# Patient Record
Sex: MALE | Race: WHITE | Employment: FULL TIME | URBAN - METROPOLITAN AREA
[De-identification: names, ages, dates, MRNs, and addresses within clinical notes are randomized per-mention and may not be internally consistent; named-entity substitution may affect disease eponyms.]

---

## 2019-04-26 ENCOUNTER — OFFICE VISIT (OUTPATIENT)
Dept: FAMILY MEDICINE CLINIC | Age: 69
End: 2019-04-26

## 2019-04-26 ENCOUNTER — HOSPITAL ENCOUNTER (OUTPATIENT)
Dept: LAB | Age: 69
Discharge: HOME | End: 2019-04-26
Payer: MEDICARE

## 2019-04-26 VITALS
BODY MASS INDEX: 22.11 KG/M2 | OXYGEN SATURATION: 96 % | TEMPERATURE: 98.1 F | DIASTOLIC BLOOD PRESSURE: 75 MMHG | SYSTOLIC BLOOD PRESSURE: 143 MMHG | RESPIRATION RATE: 18 BRPM | HEART RATE: 65 BPM | WEIGHT: 163.2 LBS | HEIGHT: 72 IN

## 2019-04-26 DIAGNOSIS — Z85.46 PERSONAL HISTORY OF MALIGNANT NEOPLASM OF PROSTATE: ICD-10-CM

## 2019-04-26 DIAGNOSIS — Z00.00 WELCOME TO MEDICARE PREVENTIVE VISIT: Primary | ICD-10-CM

## 2019-04-26 DIAGNOSIS — Z80.42 FAMILY HISTORY OF PROSTATE CANCER: ICD-10-CM

## 2019-04-26 DIAGNOSIS — K31.84 NONDIABETIC GASTROPARESIS: ICD-10-CM

## 2019-04-26 DIAGNOSIS — Z12.5 SCREENING PSA (PROSTATE SPECIFIC ANTIGEN): ICD-10-CM

## 2019-04-26 DIAGNOSIS — Z71.89 ACP (ADVANCE CARE PLANNING): ICD-10-CM

## 2019-04-26 DIAGNOSIS — Z12.11 COLON CANCER SCREENING: ICD-10-CM

## 2019-04-26 DIAGNOSIS — Z13.6 SCREENING FOR CARDIOVASCULAR CONDITION: ICD-10-CM

## 2019-04-26 LAB
ALBUMIN SERPL-MCNC: 4.1 G/DL (ref 3.4–5)
ALBUMIN/GLOB SERPL: 1.1 {RATIO} (ref 0.8–1.7)
ALP SERPL-CCNC: 65 U/L (ref 45–117)
ALT SERPL-CCNC: 29 U/L (ref 16–61)
ANION GAP SERPL CALC-SCNC: 5 MMOL/L (ref 3–18)
AST SERPL-CCNC: 20 U/L (ref 15–37)
BASOPHILS # BLD: 0 K/UL (ref 0–0.1)
BASOPHILS NFR BLD: 0 % (ref 0–2)
BILIRUB SERPL-MCNC: 0.5 MG/DL (ref 0.2–1)
BUN SERPL-MCNC: 21 MG/DL (ref 7–18)
BUN/CREAT SERPL: 20 (ref 12–20)
CALCIUM SERPL-MCNC: 8.7 MG/DL (ref 8.5–10.1)
CHLORIDE SERPL-SCNC: 103 MMOL/L (ref 100–108)
CO2 SERPL-SCNC: 30 MMOL/L (ref 21–32)
CREAT SERPL-MCNC: 1.06 MG/DL (ref 0.6–1.3)
DIFFERENTIAL METHOD BLD: ABNORMAL
EOSINOPHIL # BLD: 0.2 K/UL (ref 0–0.4)
EOSINOPHIL NFR BLD: 4 % (ref 0–5)
ERYTHROCYTE [DISTWIDTH] IN BLOOD BY AUTOMATED COUNT: 13.3 % (ref 11.6–14.5)
GLOBULIN SER CALC-MCNC: 3.6 G/DL (ref 2–4)
GLUCOSE SERPL-MCNC: 85 MG/DL (ref 74–99)
HCT VFR BLD AUTO: 40.6 % (ref 36–48)
HGB BLD-MCNC: 13.1 G/DL (ref 13–16)
LYMPHOCYTES # BLD: 2 K/UL (ref 0.9–3.6)
LYMPHOCYTES NFR BLD: 33 % (ref 21–52)
MCH RBC QN AUTO: 30.3 PG (ref 24–34)
MCHC RBC AUTO-ENTMCNC: 32.3 G/DL (ref 31–37)
MCV RBC AUTO: 93.8 FL (ref 74–97)
MONOCYTES # BLD: 0.7 K/UL (ref 0.05–1.2)
MONOCYTES NFR BLD: 12 % (ref 3–10)
NEUTS SEG # BLD: 3.1 K/UL (ref 1.8–8)
NEUTS SEG NFR BLD: 51 % (ref 40–73)
PLATELET # BLD AUTO: 212 K/UL (ref 135–420)
PMV BLD AUTO: 9.8 FL (ref 9.2–11.8)
POTASSIUM SERPL-SCNC: 4.1 MMOL/L (ref 3.5–5.5)
PROT SERPL-MCNC: 7.7 G/DL (ref 6.4–8.2)
RBC # BLD AUTO: 4.33 M/UL (ref 4.7–5.5)
SODIUM SERPL-SCNC: 138 MMOL/L (ref 136–145)
WBC # BLD AUTO: 6 K/UL (ref 4.6–13.2)

## 2019-04-26 PROCEDURE — 84153 ASSAY OF PSA TOTAL: CPT

## 2019-04-26 PROCEDURE — 85025 COMPLETE CBC W/AUTO DIFF WBC: CPT

## 2019-04-26 PROCEDURE — 80053 COMPREHEN METABOLIC PANEL: CPT

## 2019-04-26 NOTE — PROGRESS NOTES
This is a \"Welcome to [employer]"  Initial Preventive Physical Examination (IPPE) providing Personalized Prevention Plan Services (Performed in the first 12 months of enrollment) I have reviewed the patient's medical history in detail and updated the computerized patient record. History Past Medical History:  
Diagnosis Date  Diverticulitis  Gastroparesis  Gastroparesis   Genital warts  Penile swelling Past Surgical History:  
Procedure Laterality Date  APPENDECTOMY  HX APPENDECTOMY  HX COLECTOMY  PART REMOVAL COLON W ANASTOMOSIS  2007  
 colon resection Current Outpatient Medications Medication Sig Dispense Refill  CALCIUM CARB/D3/MAGNESIUM/ZINC (CALCIUM CARB-D3-MAG [id number]) [phone] mg-unit-mg-mg tab Take  by mouth.  cholecalciferol (VITAMIN D3) 1,000 unit cap Take  by mouth daily.  ibuprofen (MOTRIN) 200 mg tablet Take  by mouth every six (6) hours as needed for Pain. No Known Allergies Family History Problem Relation Age of Onset  Other Mother   
     non-hodgkins lymphoma  Cancer Mother  Obesity Mother  Cancer Father   
     prostate  Prostate Cancer Father  Diabetes Paternal Grandfather  No Known Problems Sister  No Known Problems Brother Social History Tobacco Use  Smoking status: Former Smoker Packs/day: 1.00 Years: 35.00 Pack years: 35.00 Types: Cigarettes Last attempt to quit: 2014 Years since quittin.4  Smokeless tobacco: Never Used Substance Use Topics  Alcohol use: Yes Alcohol/week: 7.2 oz Types: 12 Glasses of wine per week Diet, Lifestyle: No special diet Exercise level: extremely active Depression Risk Screen 3 most recent PHQ Screens 2019 Little interest or pleasure in doing things Not at all Feeling down, depressed, irritable, or hopeless Not at all Total Score PHQ 2 0  
 
 Alcohol Risk Screen You do not drink alcohol or very rarely. You average more than 14 drinks a week. \" I know I drink more than is recommended\" Functional Ability and Level of Safety Hearing Loss Hearing is good. Vision Screening Vision is good. Visual Acuity Screening Right eye Left eye Both eyes Without correction: 20/50 20/50 20/40 With correction: Activities of Daily Living The home contains: no safety equipment. Patient does total self care Fall Risk Screen Fall Risk Assessment, last 12 mths 4/26/2019 Able to walk? Yes Fall in past 12 months? No  
 
 
Abuse Screen Patient is not abused Screening EKG EKG order placed: No 
 
Visit Vitals /75 (BP 1 Location: Left arm, BP Patient Position: Sitting) Pulse 65 Temp 98.1 °F (36.7 °C) (Oral) Resp 18 Ht 6' (1.829 m) Wt 163 lb 3.2 oz (74 kg) SpO2 96% BMI 22.13 kg/m² A&O x3 
RRR, no murmur CTAB, no wheezes or rales Gait normal 
Mood and affect normal. 
 
Patient Care Team  
Patient Care Team: 
Clarise Harada, Daralyn Breed as PCP - General (Physician Assistant) End of Life Planning Advanced care planning directives were discussed with the patient and /or family/caregiver. Assessment/Plan Education and counseling provided: 
Are appropriate based on today's review and evaluation End-of-Life planning (with patient's consent) Prostate cancer screening tests (PSA, covered annually) Colorectal cancer screening tests Diagnoses and all orders for this visit: 
 
1. Welcome to Medicare preventive visit 2. Screening PSA (prostate specific antigen) -     PSA W/ REFLX FREE PSA; Future -     CBC WITH AUTOMATED DIFF; Future 3. Family history of prostate cancer -     PSA W/ REFLX FREE PSA; Future -     CBC WITH AUTOMATED DIFF; Future 4. Colon cancer screening -     OCCULT BLOOD IMMUNOASSAY,DIAGNOSTIC; Future 5. ACP (advance care planning) -     ADVANCE CARE PLANNING FIRST 30 MINS 6. Screening for cardiovascular condition 
-     US EXAM SCREENING AAA; Future 7. Nondiabetic gastroparesis -     CBC WITH AUTOMATED DIFF; Future -     METABOLIC PANEL, COMPREHENSIVE; Future 8. Personal history of malignant neoplasm of prostate -     PSA W/ REFLX FREE PSA; Future Health Maintenance Due Topic Date Due  
 Hepatitis C Screening  1950  Shingrix Vaccine Age 50> (1 of 2) 07/03/1998  
 FOBT Q 1 YEAR AGE 50-75  07/03/1998  GLAUCOMA SCREENING Q2Y  07/03/2013  DTaP/Tdap/Td series (2 - Td) 08/06/2017  Pneumococcal 65+ years (2 of 2 - PPSV23) 01/29/2018

## 2019-04-26 NOTE — PROGRESS NOTES
Shelly Ledesma is a 77 y.o. male here today as a new patient to our practice. He is here for a Medicare Wellness Visit. All assessment completed. Patient states he had a colonoscopy about 12 years ago by Dr. Devora Rogers. It was normal. 
He has an eye exam coming up on May 1, 2019. He had his pneumonia vaccines from the [facility] on [address]. Will request a copy and he is on the waiting list for his shingles vaccine.

## 2019-04-26 NOTE — PATIENT INSTRUCTIONS
Medicare Wellness Visit, Male The best way to live healthy is to have a lifestyle where you eat a well-balanced diet, exercise regularly, limit alcohol use, and quit all forms of tobacco/nicotine, if applicable. Regular preventive services are another way to keep healthy. Preventive services (vaccines, screening tests, monitoring & exams) can help personalize your care plan, which helps you manage your own care. Screening tests can find health problems at the earliest stages, when they are easiest to treat. [facility] follows the current, evidence-based guidelines published by the [facility] [place] ([facility]) when recommending preventive services for our patients. Because we follow these guidelines, sometimes recommendations change over time as research supports it. (For example, a prostate screening blood test is no longer routinely recommended for men with no symptoms.) Of course, you and your doctor may decide to screen more often for some diseases, based on your risk and co-morbidities (chronic disease you are already diagnosed with). Preventive services for you include: - Medicare offers their members a free annual wellness visit, which is time for you and your primary care provider to discuss and plan for your preventive service needs. Take advantage of this benefit every year! 
-All adults over age 72 should receive the recommended pneumonia vaccines. Current USPSTF guidelines recommend a series of two vaccines for the best pneumonia protection.  
-All adults should have a flu vaccine yearly and an ECG.  All adults age 61 and older should receive a shingles vaccine once in their lifetime.   
-All adults age 38-68 who are overweight should have a diabetes screening test once every three years.  
-Other screening tests & preventive services for persons with diabetes include: an eye exam to screen for diabetic retinopathy, a kidney function test, a foot exam, and stricter control over your cholesterol.  
-Cardiovascular screening for adults with routine risk involves an electrocardiogram (ECG) at intervals determined by the provider.  
-Colorectal cancer screening should be done for adults age 54-65 with no increased risk factors for colorectal cancer. There are a number of acceptable methods of screening for this type of cancer. Each test has its own benefits and drawbacks. Discuss with your provider what is most appropriate for you during your annual wellness visit. The different tests include: colonoscopy (considered the best screening method), a fecal occult blood test, a fecal DNA test, and sigmoidoscopy. 
-All adults born between [facility] should be screened once for Hepatitis C. 
-An Abdominal Aortic Aneurysm (AAA) Screening is recommended for men age 73-68 who has ever smoked in their lifetime. Here is a list of your current Health Maintenance items (your personalized list of preventive services) with a due date: 
Health Maintenance Due Topic Date Due  
 Hepatitis C Test  1950  DTaP/Tdap/Td  (1 - Tdap) 07/03/1969  Shingles Vaccine (1 of 2) 07/03/1998  Stool testing for trace blood  07/03/1998  Glaucoma Screening   07/03/2013  Pneumococcal Vaccine (1 of 2 - PCV13) 07/03/2013 [facility] Annual Well Visit  03/14/2018 Medicare provides coverage of a one-time preventive ultrasound screening for the early detection of an AAA for eligible beneficiaries who meet the following criteria: ? The beneficiary receives a referral for an ultrasound screening for AAA as a result of an IPPE; 
? The beneficiary receives a referral from a provider or supplier who is authorized to provide covered ultrasound diagnostic services ? The beneficiary has not been previously furnished an ultrasound screening for AAA under the Medicare Program 
The beneficiary is included in at least one of the following risk categories: ? The beneficiary has a family history of AAAs ? The beneficiary is a man 72 through 76years of age who has smoked at least 100 cigarettes in his lifetime; or ? The beneficiary manifests other risk factors in a beneficiary category recommended for ultrasound screening by the [facility] [place] (USPSTF) regarding AAAs, as specified by the [place] of Health and [employer] through the national coverage determination process. Important NoteOnly Medicare beneficiaries who receive a referral for the ultrasound screening for AAA as a result of the IPPE will be covered for this benefit. Medicare provides coverage for the ultrasound screening for AAA as a Medicare Part B benefit. The coinsurance or copayment applies to this benefit. The Medicare Part B deductible is waived. For dates of service on or after January 1, 2011, both the coinsurance or copayment and deductible are waived. NOTE: The Medicare Part B deductible does not apply to [facility]) services.

## 2019-04-28 LAB
PSA SERPL-MCNC: 1 NG/ML (ref 0–4)
REFLEX CRITERIA: NORMAL

## 2019-05-01 ENCOUNTER — OFFICE VISIT (OUTPATIENT)
Dept: FAMILY MEDICINE CLINIC | Age: 69
End: 2019-05-01

## 2019-05-01 ENCOUNTER — HOSPITAL ENCOUNTER (OUTPATIENT)
Dept: LAB | Age: 69
Discharge: HOME | End: 2019-05-01
Payer: MEDICARE

## 2019-05-01 VITALS
OXYGEN SATURATION: 95 % | WEIGHT: 161 LBS | DIASTOLIC BLOOD PRESSURE: 77 MMHG | HEART RATE: 75 BPM | TEMPERATURE: 98.3 F | BODY MASS INDEX: 21.81 KG/M2 | RESPIRATION RATE: 18 BRPM | HEIGHT: 72 IN | SYSTOLIC BLOOD PRESSURE: 143 MMHG

## 2019-05-01 DIAGNOSIS — Z13.220 SCREENING, LIPID: ICD-10-CM

## 2019-05-01 DIAGNOSIS — Z00.00 ANNUAL PHYSICAL EXAM: Primary | ICD-10-CM

## 2019-05-01 LAB
CHOLEST SERPL-MCNC: 212 MG/DL
HDLC SERPL-MCNC: 82 MG/DL (ref 40–60)
HDLC SERPL: 2.6 {RATIO} (ref 0–5)
LDLC SERPL CALC-MCNC: 120.6 MG/DL (ref 0–100)
LIPID PROFILE,FLP: ABNORMAL
TRIGL SERPL-MCNC: 47 MG/DL (ref ?–150)
VLDLC SERPL CALC-MCNC: 9.4 MG/DL

## 2019-05-01 PROCEDURE — 80061 LIPID PANEL: CPT

## 2019-05-01 NOTE — PROGRESS NOTES
Subjective:  
 
Danielle Veloz is a 77 y.o. male presenting for annual exam and complete physical. 
 
Patient Active Problem List  
Diagnosis Code  Penile swelling N48.89  Genital warts A63.0  
 Gastroparesis K31.84  
 Diverticulitis K57.92 Patient Active Problem List  
 Diagnosis Date Noted  Penile swelling  Genital warts  Gastroparesis  Diverticulitis Current Outpatient Medications Medication Sig Dispense Refill  latanoprost (XALATAN) 0.005 % ophthalmic solution   0  
 CALCIUM CARB/D3/MAGNESIUM/ZINC (CALCIUM CARB-D3-MAG [id number]) [phone] mg-unit-mg-mg tab Take  by mouth.  ibuprofen (MOTRIN) 200 mg tablet Take  by mouth every six (6) hours as needed for Pain.  promethazine (PHENERGAN) 25 mg tablet 25 mg.    
 MULTIVIT-MIN/FA/LYCOPEN/LUTEIN (MEN 50 PLUS MULTIVITAMIN PO) Take  by mouth.  cholecalciferol (VITAMIN D3) 1,000 unit cap Take  by mouth daily. No Known Allergies Past Medical History:  
Diagnosis Date  Diverticulitis  Gastroparesis  Gastroparesis   Genital warts  Penile swelling Past Surgical History:  
Procedure Laterality Date  APPENDECTOMY  HX APPENDECTOMY  HX COLECTOMY  PART REMOVAL COLON W ANASTOMOSIS  2007  
 colon resection Family History Problem Relation Age of Onset  Other Mother   
     non-hodgkins lymphoma  Cancer Mother  Obesity Mother  Cancer Father   
     prostate  Prostate Cancer Father  Diabetes Paternal Grandfather  No Known Problems Sister  No Known Problems Brother Social History Tobacco Use  Smoking status: Former Smoker Packs/day: 1.00 Years: 35.00 Pack years: 35.00 Types: Cigarettes Last attempt to quit: 2014 Years since quittin.4  Smokeless tobacco: Never Used Substance Use Topics  Alcohol use: Yes Alcohol/week: 7.2 oz Types: 12 Glasses of wine per week Lab Results Component Value Date/Time WBC 6.0 04/26/2019 02:44 PM  
 HGB 13.1 04/26/2019 02:44 PM  
 HCT 40.6 04/26/2019 02:44 PM  
 PLATELET 396 79/75/0409 02:44 PM  
 MCV 93.8 04/26/2019 02:44 PM  
 
Lab Results Component Value Date/Time Sodium 138 04/26/2019 02:44 PM  
 Potassium 4.1 04/26/2019 02:44 PM  
 Chloride 103 04/26/2019 02:44 PM  
 CO2 30 04/26/2019 02:44 PM  
 Anion gap 5 04/26/2019 02:44 PM  
 Glucose 85 04/26/2019 02:44 PM  
 BUN 21 (H) 04/26/2019 02:44 PM  
 Creatinine 1.06 04/26/2019 02:44 PM  
 BUN/Creatinine ratio 20 04/26/2019 02:44 PM  
 GFR est AA >60 04/26/2019 02:44 PM  
 GFR est non-AA >60 04/26/2019 02:44 PM  
 Calcium 8.7 04/26/2019 02:44 PM  
 Bilirubin, total 0.5 04/26/2019 02:44 PM  
 ALT (SGPT) 29 04/26/2019 02:44 PM  
 AST (SGOT) 20 04/26/2019 02:44 PM  
 Alk. phosphatase 65 04/26/2019 02:44 PM  
 Protein, total 7.7 04/26/2019 02:44 PM  
 Albumin 4.1 04/26/2019 02:44 PM  
 Globulin 3.6 04/26/2019 02:44 PM  
 A-G Ratio 1.1 04/26/2019 02:44 PM  
   
 
Review of Systems A comprehensive review of systems was negative except for that written in the HPI. Objective:  
 
Visit Vitals /77 (BP 1 Location: Left arm, BP Patient Position: Sitting) Pulse 75 Temp 98.3 °F (36.8 °C) (Oral) Resp 18 Ht 6' (1.829 m) Wt 161 lb (73 kg) SpO2 95% BMI 21.84 kg/m² Physical exam:  
General appearance - alert, well appearing, and in no distress, oriented to person, place, and time and normal appearing weight Mental status - alert, oriented to person, place, and time Eyes - pupils equal and reactive, extraocular eye movements intact Ears - bilateral TM's and external ear canals normal 
Nose - normal and patent, no erythema, discharge or polyps Mouth - mucous membranes moist, pharynx normal without lesions. Dentures top and bottom Neck - supple, no significant adenopathy Chest - clear to auscultation, no wheezes, rales or rhonchi, symmetric air entry Heart - normal rate, regular rhythm, normal S1, S2, no murmurs, rubs, clicks or gallops Abdomen - soft, nontender, nondistended, no masses or organomegaly  Male - no penile lesions or discharge, no testicular masses or tenderness, no hernias Rectal - negative without mass, lesions or tenderness, PROSTATE EXAM: smooth and symmetric without nodules or tenderness Neurological - alert, oriented, normal speech, no focal findings or movement disorder noted Musculoskeletal - full range of motion without pain Extremities - no pedal edema noted, intact peripheral pulses Skin - normal coloration and turgor, no rashes, no suspicious skin lesions noted Assessment/Plan: ICD-10-CM ICD-9-CM 1. Annual physical exam Z00.00 V70.0 2. Screening, lipid Z13.220 V77.91 LIPID PANEL Reviewed lab results. PSA is normal.  
Fasting lipids today. He will check his home records for previous Hep C screening. If he cannot find this, will return for Hep C Patient agrees with plan and verbalizes understanding. Follow-up and Dispositions · Return if symptoms worsen or fail to improve. Ari Hook PA-C 
05/01/19

## 2019-05-01 NOTE — PROGRESS NOTES
Jose Gomes is a 77 y.o. male here for a complete physical. He states there has been no change since last visit and no complaints at this.

## 2019-05-01 NOTE — ACP (ADVANCE CARE PLANNING)
Advance Care Planning (ACP) Provider Note - Comprehensive     Date of ACP Conversation: 05/01/19  Persons included in Conversation:  patient  Length of ACP Conversation in minutes:  16 minutes    Authorized Decision Maker (if patient is incapable of making informed decisions):    This person is:  Healthcare Agent/Medical Power of  under Advance Directive            General ACP for ALL Patients with Decision Making Capacity:   Importance of advance care planning, including choosing a healthcare agent to communicate patient's healthcare decisions if patient lost the ability to make decisions, such as after a sudden illness or accident  Understanding of the healthcare agent role was assessed and information provided    Interventions Provided:  Recommended completion of Advance Directive form after review of ACP materials and conversation with prospective healthcare agent   Recommended communicating the plan and making copies for the healthcare agent, personal physician, and others as appropriate (e.g., health system)  Recommended review of completed ACP document annually or upon change in health status

## 2020-01-28 ENCOUNTER — HOSPITAL ENCOUNTER (OUTPATIENT)
Dept: LAB | Age: 70
Discharge: HOME | End: 2020-01-28
Payer: MEDICARE

## 2020-01-28 DIAGNOSIS — Z12.11 COLON CANCER SCREENING: ICD-10-CM

## 2020-01-28 PROCEDURE — 82274 ASSAY TEST FOR BLOOD FECAL: CPT

## 2020-01-31 LAB — HEMOCCULT STL QL IA: NEGATIVE

## 2023-01-31 RX ORDER — PROMETHAZINE HYDROCHLORIDE 25 MG/1
25 TABLET ORAL
COMMUNITY

## 2023-01-31 RX ORDER — IBUPROFEN 200 MG
TABLET ORAL EVERY 6 HOURS PRN
COMMUNITY

## 2023-01-31 RX ORDER — METOCLOPRAMIDE 5 MG/1
5 TABLET ORAL
COMMUNITY